# Patient Record
Sex: FEMALE | Race: WHITE
[De-identification: names, ages, dates, MRNs, and addresses within clinical notes are randomized per-mention and may not be internally consistent; named-entity substitution may affect disease eponyms.]

---

## 2017-01-27 ENCOUNTER — HOSPITAL ENCOUNTER (EMERGENCY)
Dept: HOSPITAL 62 - ER | Age: 27
Discharge: HOME | End: 2017-01-27
Payer: SELF-PAY

## 2017-01-27 VITALS — SYSTOLIC BLOOD PRESSURE: 116 MMHG | DIASTOLIC BLOOD PRESSURE: 70 MMHG

## 2017-01-27 DIAGNOSIS — Z87.892: ICD-10-CM

## 2017-01-27 DIAGNOSIS — Z88.6: ICD-10-CM

## 2017-01-27 DIAGNOSIS — F17.210: ICD-10-CM

## 2017-01-27 DIAGNOSIS — Z88.8: ICD-10-CM

## 2017-01-27 DIAGNOSIS — N83.201: Primary | ICD-10-CM

## 2017-01-27 DIAGNOSIS — N30.00: ICD-10-CM

## 2017-01-27 DIAGNOSIS — N76.0: ICD-10-CM

## 2017-01-27 DIAGNOSIS — Z88.0: ICD-10-CM

## 2017-01-27 DIAGNOSIS — R10.31: ICD-10-CM

## 2017-01-27 LAB
APPEARANCE UR: (no result)
BILIRUB UR QL STRIP: NEGATIVE
CHLAM PCR: NOT DETECTED
GLUCOSE UR STRIP-MCNC: NEGATIVE MG/DL
KETONES UR STRIP-MCNC: NEGATIVE MG/DL
NITRITE UR QL STRIP: POSITIVE
PH UR STRIP: 7 [PH] (ref 5–9)
PROT UR STRIP-MCNC: NEGATIVE MG/DL
SP GR UR STRIP: 1.02
UROBILINOGEN UR-MCNC: NEGATIVE MG/DL (ref ?–2)

## 2017-01-27 PROCEDURE — S0119 ONDANSETRON 4 MG: HCPCS

## 2017-01-27 PROCEDURE — 87591 N.GONORRHOEAE DNA AMP PROB: CPT

## 2017-01-27 PROCEDURE — 81001 URINALYSIS AUTO W/SCOPE: CPT

## 2017-01-27 PROCEDURE — 87491 CHLMYD TRACH DNA AMP PROBE: CPT

## 2017-01-27 PROCEDURE — 81025 URINE PREGNANCY TEST: CPT

## 2017-01-27 PROCEDURE — 99283 EMERGENCY DEPT VISIT LOW MDM: CPT

## 2017-01-27 NOTE — ER DOCUMENT REPORT
ED GI/





- General


Chief Complaint: Vaginal Itching


Stated Complaint: PAINFUL URINATION


Mode of Arrival: Ambulatory


Notes: 


Patient is complaining of pain in the right lower quadrant of her abdomen which 

she attributes to an ovary with multiple cysts that are known to be on that 

right pelvis and are scheduled to be surgically removed on February 10.  

Patient says that she went for a preliminary pelvic examination 2 days ago in 

which they examined her and also did a Pap smear and used a transvaginal 

ultrasound to examine her, in preparation for her surgery.  Yesterday, she 

noted the right lower quadrant pain and vaginal discharge with itching and 

swelling and odor.  She had some vomiting yesterday but not today, just 

nauseated and unable to eat.  She's had burning with urination and some 

frequent urination.  Not aware of any fever today, but states she may have had 

one yesterday.


LMP 1/14 on the birth control implant.


Patient has had 5 prior pelvic surgeries for ovarian cysts.  The plan is to 

remove the entire right ovary and the cyst in her surgery in 2 weeks.


TRAVEL OUTSIDE OF THE [place] IN LAST 30 DAYS: No





- Related Data


Allergies/Adverse Reactions: 


 





Penicillins Allergy (Severe, Verified 01/27/17 16:14)


 Anaphylaxis


acetaminophen [From Tylenol] Allergy (Intermediate, Verified 01/27/17 16:14)


 rash


ketorolac tromethamine [From Toradol] Allergy (Intermediate, Verified 01/27/17 

16:14)


 hyperventilate











Past Medical History





- General


Information source: Patient





- Social History


Smoking Status: Current Every Day Smoker


Cigarette use (# per day): Yes


Chew tobacco use (# tins/day): No


Frequency of alcohol use: None


Drug Abuse: None


Family History: Reviewed & Not Pertinent


Patient has suicidal ideation: No


Patient has homicidal ideation: No


Pulmonary Medical History: Reports: Hx Pneumonia


Renal/ Medical History: Reports: Hx Ovarian Cysts


Musculoskeltal Medical History: Denies Hx Arthritis


Psychiatric Medical History: Reports: Hx Anxiety, Hx Attention Deficit 

Hyperactivity Disorder, Hx Depression


Past Surgical History: Reports: Hx Gynecologic Surgery - ovarian cyst bilat





- Immunizations


Hx Diphtheria, Pertussis, Tetanus Vaccination: Yes


Hx Pneumococcal Vaccination: 11/01/15





Review of Systems





- Review of Systems


Notes: 


REVIEW OF SYSTEMS:


CONSTITUTIONAL :  Denies fever now, but think she had fever last night..  


EENT:   Denies eye, ear, nose or mouth or throat pain or other symptoms.


CARDIOVASCULAR:  Denies chest pain.


RESPIRATORY:  Denies cough, chest congestion, or shortness of breath.


GASTROINTESTINAL: See history of present illness.


GENITOURINARY: See history of present illness.


MUSCULOSKELETAL:  Denies back or neck pain.  Denies joint pain or swelling.


SKIN:   Denies rash or skin lesions.


NEUROLOGICAL:  Denies LOC or altered mental status.  Denies headache.  Denies 

sensory loss or motor deficits.


PSYCHIATRIC: History of anxiety and depression





ALL OTHER SYSTEMS REVIEWED AND NEGATIVE.





Physical Exam





- Vital signs


Vitals: 


 











Temp Pulse Resp BP Pulse Ox


 


 98.0 F   89   18   116/70   99 


 


 01/27/17 16:10  01/27/17 16:10  01/27/17 16:10  01/27/17 16:10  01/27/17 16:10











Interpretation: Normal





- Notes


Notes: 


PHYSICAL EXAMINATION:





GENERAL: Well-appearing, in no acute distress.  Vital signs are all normal.





HEAD: Atraumatic, normocephalic.





LUNGS: Breath sounds clear and equal bilaterally.





HEART: Regular rate and rhythm without murmurs.





ABDOMEN: Soft, but tender in the right lower quadrant/pelvic region.  The 

maximal point of tenderness is below the usual expected location of the 

appendix at McBurney's point.  No guarding or rebound.  No masses.  Patient 

says that the location of her pain is exactly where her ovary and cysts are 

located.


Pelvic exam deferred at this time.





BACK:  No tenderness throughout entire back.





EXTREMITIES: Normal range of motion without pain.





SKIN: Warm, dry, no rashes.





Course





- Re-evaluation


Re-evalutation: 





01/27/17 19:07


Urinalysis suggests a UTI with positive nitrites, leukocyte esterase, WBCs and 

bacteria seen on microscopic.


Chlamydia test negative.  Gonorrhea negative.





- Vital Signs


Vital signs: 


 











Temp Pulse Resp BP Pulse Ox


 


 98.0 F   89   18   116/70   99 


 


 01/27/17 16:10  01/27/17 16:10  01/27/17 16:10  01/27/17 16:10  01/27/17 16:10














- Laboratory


Laboratory results interpreted by me: 


 











  01/27/17





  16:37


 


Urine Nitrite  POSITIVE H


 


Ur Leukocyte Esterase  SMALL H














Discharge





- Discharge


Clinical Impression: 


UTI (urinary tract infection)


Qualifiers:


 Urinary tract infection type: acute cystitis Hematuria presence: without 

hematuria Qualified Code(s): N30.00 - Acute cystitis without hematuria





Vaginitis


Qualifiers:


 Chronicity: acute Qualified Code(s): N76.0 - Acute vaginitis





Ovarian cyst


Qualifiers:


 Laterality: right Qualified Code(s): N83.201 - Unspecified ovarian cyst, right 

side





Condition: Stable


Disposition: HOME, SELF-CARE


Additional Instructions: 


URINARY TRACT INFECTION:


     Your evaluation indicates that you have a urinary tract infection. This is 

due to germs growing in the bladder.  This is a common problem.


     This infection usually responds quickly to antibiotics.  Your antibiotic 

should be taken exactly as prescribed.  Drink plenty of fluids -- three to four 

quarts a day.


     Occasionally, a bladder anesthetic will be prescribed to help stop the 

feeling of urgency until the antibiotic has a chance to clear the infection.  

This may cause your urine to be dark orange.


     Certain urine infections require a culture.  If the doctor obtained a 

culture, the results will be back in two days.  You should call to see if a 

change in treatment is needed.


     A repeat urinalysis after you finish treatment is often recommended.  The 

physician will let you know if further testing is required.


     Call the doctor if you develop fever, chills, flank pain, inability to 

urinate, or blood in the urine.





ANTIBIOTIC THERAPY:


     You have been given an antibiotic prescription.  It's important that you 

take all the medication, unless instructed otherwise by your physician.  

Failure to complete the entire course can result in relapse of your condition.


     Common side effects of antibiotics include nausea, intestinal cramping, or 

diarrhea.  Women may develop vaginal yeast infections, and babies can get yeast 

(thrush) in the mouth following the use of antibiotics.  Contact your physician 

if you develop significant side effects from this medication.


     Allergy to this antibiotic can result in hives, wheezing, faintness, or 

itching.  If symptoms of allergy occur, stop the medication and call the doctor.





TRIMETHOPRIM-SULFA:


     You have been given a prescription for trimethoprim-sulfa (TMS, Septra, 

Bactrim).  This is a combination antibiotic of the sulfa class, often used for 

urinary tract infections, middle ear infections, bronchitis, shigella 

intestinal infection, and Pneumocystis pneumonia.


     TMS is usually well-tolerated.  Occasional side effects include nausea and 

decreased appetite.  Septra is not recommended for infants less than two months 

of age.  Do not take this medication if you have experienced severe side 

effects or allergy to sulfa medicine.


     You should stop this medicine at once and contact your physician if you 

develop any rash, joint pain, shortness of breath, bruising, or jaundice (

yellow color in the skin), or if you develop any other new or unusual symptoms.





VAGINITIS:


     Your exam shows that you have vaginitis, a vaginal infection.  The 

infection can be caused by a many different organisms, including trichomonas or 

Gardnerella.  The usual symptoms are vaginal irritation and discharge.


     The treatment is usually antibiotics such as Flagyl.  Laboratory tests can 

determine which germ is responsible.


     Use the medication as prescribed.  Because this infection can be 

transmitted sexually, your sexual partner may need to be checked and treated 

also.  If your physician has not discussed this with you, please check before 

resuming sexual relations.  If a culture shows gonorrhea or chlamydia, the 

infection must be reported to the health department.


     Call the doctor if you develop pelvic pain, fever, or problems with 

urination, or if you don't improve as expected.





VAGINAL YEAST INFECTION:


     You have evidence of a yeast infection -- called "candida."  A vaginal 

yeast infection often causes itching and discharge.  While not dangerous, it 

can be very unpleasant.  A yeast infection often follows the use of powerful 

antibiotics.  It is more likely to occur in diabetics.


     The treatment now is usually a single pill of Diflucan, but also an 

antifungal cream or suppository may be used for a few days.  You do not need to 

avoid sexual intercourse.


     Recurrences are common.  You can make a recurrence less likely by wearing 

cotton underwear and avoiding tight clothing.  For mild recurrences, you can 

try over-the-counter creams or suppositories that are made specifically for 

yeast.


     If the symptoms do not resolve, you should follow up for re-examination.  

Sometimes treatment of the sexual partner is necessary if infections are 

recurrent.





VAGINAL TRICHOMONAS INFECTION:


     Trichomoniasis is infection of the vagina or male genital tract with 

Trichomonas vaginalis. It can be asymptomatic or cause urethritis, vaginitis, 

or occasionally cystitis, epididymitis, or prostatitis. Diagnosis is by 

microscopic examination of vaginal or prostatic secretions or by urethral 

culture. Patients and sex partners are treated with metronidazole.


     T. vaginalis is a flagellated, sexually transmitted protozoan that more 

often infects women (about 20% of women of reproductive age) than men. 

Infection may be asymptomatic in either sex, but asymptomatic is the rule for 

men. In men, protozoa may persist for long periods in the  tract without 

causing symptoms; thus, protozoa may be transmitted unwittingly to sex 

partners. Trichomoniasis may account for up to 5% of nongonococcal, 

nonchlamydial urethritis in men in some areas. Co-infection with gonorrhea and 

other sexually transmitted diseases (STDs) is common.


     In women, symptoms range from none to copious, yellow-green, frothy 

vaginal discharge with soreness of the vulva and perineum, dyspareunia, and 

dysuria. Asymptomatic infection may become symptomatic at any time as the vulva 

and perineum become inflamed and edema develops in the labia. The vaginal walls 

and surface of the cervix may have punctate, red "strawberry" spots. Urethritis 

and possibly cystitis may also occur.


     Men are usually asymptomatic; however, sometimes urethritis results in a 

discharge that may be transient, frothy, or purulent or that causes dysuria and 

frequency, usually early in the morning. Often, urethritis is mild and causes 

only minimal urethral irritation and occasional moisture at the urethral meatus

, under the foreskin, or both. Epididymitis and prostatitis are rare 

complications.


     Trichomoniasis is suspected in women with vaginitis, in men with urethritis

, and in their sex partners. Suspicion is high if symptoms persist after 

patients have been evaluated and treated for other infections such as gonorrhea 

and chlamydial, mycoplasmal, and ureaplasmal infections.


     In women, diagnosis is based on clinical criteria and in-office testing. 

The saline wet mount is examined microscopically as soon as possible to detect 

trichomonads.In men, microscopy of urine is insensitive, although occasionally 

organisms are visible in a first-voided morning specimen or a centrifuged 

specimen. Cultures of urine and urethral swabs are more sensitive.


     As with diagnosis of any STD, patients with trichomoniasis should be 

tested to exclude other common STDs such as gonorrhea and chlamydial infection.


     Metronidazole or tinidazole 2 g po in a single dose cures up to 95% of 

women if sex partners are treated simultaneously. Effectiveness of single-dose 

regimens in men is not as clear, so treatment is typically with metronidazole 

or tinidazole 500 mg bid for 5 to 7 days.


     Sex partners should be screened and treated for trichomoniasis and other 

STDs. If poor adherence to follow-up is likely, treatment can be initiated in 

sex partners of patients with documented trichomoniasis without confirming the 

diagnosis in the partner.





ANTIBIOTIC THERAPY:


     You have been given an antibiotic prescription.  It's important that you 

take all the medication, unless instructed otherwise by your physician.  

Failure to complete the entire course can result in relapse of your condition.


     Common side effects of antibiotics include nausea, intestinal cramping, or 

diarrhea.  Women may develop vaginal yeast infections, and babies can get yeast 

(thrush) in the mouth following the use of antibiotics.  Contact your physician 

if you develop significant side effects from this medication.


     Allergy to this antibiotic can result in hives, wheezing, faintness, or 

itching.  If symptoms of allergy occur, stop the medication and call the doctor.





AZITHROMYCIN:


     Azithromycin (Zithromax) is a broad spectrum antibiotic in the same class 

as erythromycin.  It can treat a variety of bacterial infections, but is most 

frequently used for respiratory infections.


     Azithromycin is extremely long-lasting.  It accumulates in body tissues 

and continues to kill bacteria for many days.


     In order to improve absorption, Azithromycin should be taken at least one 

hour before or two hours after a meal.  It does not have the same strong 

tendency to upset the stomach as erythromycin and is usually very well 

tolerated.


     Patients who have had a rash or other true allergic reactions to 

erythromycin should not take this medication.  Call if you develop 

gastrointestinal distress, severe diarrhea, rash, hives, itching, or shortness 

of breath.





METRONIDAZOLE:


     Metronidazole (Flagyl) has been prescribed.  This medication is used to 

kill a type of bacteria called anaerobes, and protozoan parasites such as 

trichomonas and Giardia.


     Flagyl often causes a metallic taste in the mouth and mild nausea.


     Do not use alcohol in any form with Flagyl (including alcohol in 

medication elixirs).  Flagyl interacts with alcohol to cause flushing, 

palpitations, headache, stomach cramps, and vomiting.  Do not use Flagyl if you 

are taking Antabuse (disulfiram).


     Call the doctor at once if you develop rash, shortness of breath, itching, 

or lightheadedness.





FLUCONAZOLE:


     Fluconazole (Diflucan) is an antifungal drug.  It is useful for serious 

fungal infections, but is also excellent for oral or vaginal yeast infections.


     Diflucan interacts with some medicines.  This is a concern if you are 

taking anticoagulants (such as Coumadin), phenytoin (Dilantin), cyclosporin, or 

oral hypoglycemics (such as tolbutamide, Orinase, glipizide, Glucotrol, 

glyburide, DiaBeta, Glynase, and Micronase).  Be sure the doctor knows if you 

are taking one of these medicines.


     We don't know how Diflucan affects pregnancy.  If you are planning to 

become pregnant, discuss this with your doctor.


     Diflucan has few side effects.  Minor side effects may include nausea, 

headache, or diarrhea.  Call the doctor if you develop a skin rash, shortness 

of breath, or other new symptoms.





Ovarian Cyst


     Your examination shows the presence of an ovarian cyst.  This is a ball of 

fluid attached to the ovary.  Ovarian cysts in women of child-bearing age are 

usually innocent.  However, the cyst may cause pain when it grows or bursts.  

An innocent ovarian cyst will usually go away by itself.


     When the cyst becomes painful, you should rest.  Pain medication may be 

required.  Some women find a hot water bottle soothing.  The pain usually 

resolves within one or two days.


     After menopause, an ovarian cyst may mean a tumor, and requires more 

aggressive evaluation -- usually surgery is recommended to remove or biopsy the 

cyst.  A very large cyst requires evaluation at any age. Most cysts (even the 

innocent ones) require follow-up examination.


     Call the doctor or return at any time if the pain increases significantly, 

if you become faint, or if you experience vaginal bleeding.





Antinausea Medication


     You have been given a medication to suppress nausea and vomiting. This 

type of medication can be given as a shot, pill, or suppository. It will 

usually last for many hours.  Pills and shots usually last six to eight hours, 

suppositories last about 12 hours.  For the typical illness, only one or two 

doses of the medication may be necessary.


     Mild lightheadedness may occur.  This type of medicine can cause 

drowsiness.  Do not drive or operate dangerous machinery while under its 

influence.  Do not mix with alcohol.


     See your doctor at once if you have muscle spasms or tightness, or 

uncontrollable motions (particularly of the neck, mouth, or jaw). Persistent 

vomiting or severe lightheadedness should also be evaluated by the physician.





Oral Narcotic Medication


     You have been given a prescription for pain control.  This medication is a 

narcotic.  It's best taken with food, as nausea can result if taken on an empty 

stomach.


     Don't operate machinery or drive within six hours of taking this 

medication.  Do not combine this medicine with alcohol, or with any medication 

which can cause sedation (such as cold tablets or sleeping pills) unless you 

get permission from the physician.


     Narcotics tend to cause constipation.  If possible, drink plenty of fluids 

and eat a diet high in fiber and fruits.





Ibuprofen


     Ibuprofen is an excellent, safe drug for pain control.  In addition, it 

has potent antiinflammatory effects which are beneficial, especially in the 

treatment of injuries, arthritis, or tendonitis. It's best to take ibuprofen 

with food.  Persons with ulcer disease or allergy to aspirin should notify 

their physician of this before taking ibuprofen.


     Take the medication exactly as prescribed.  Don't take additional doses 

unless instructed to do so by your doctor.  If you develop wheezing, shortness 

of breath, hives, faintness, stomach pain, vomiting, or dark black stools, 

return for re-evaluation at once.





FOLLOW-UP CARE:


If you have been referred to a physician for follow-up care, call the physician

s office for an appointment as you were instructed or within the next two days.

  If you experience worsening or a significant change in your symptoms, notify 

the physician immediately or return to the Emergency Department at any time for 

re-evaluation.





[facility]





Return for reevaluation if you develop fever, worsening abdominal pains, or 

other new or worrisome symptoms.


Prescriptions: 


Oxycodone HCl 5 - 10 mg PO Q6HP PRN #12 tablet


 PRN Reason: 


Metronidazole 500 mg PO BID #14 tablet


Promethazine HCl [Phenergan 25 mg Tablet] 1 - 2 tab PO Q6H PRN #15 tablet


 PRN Reason: 


Sulfamethoxazole/Trimethoprim [Bactrim Ds Tablet] 1 each PO BID #10 tablet

## 2018-11-14 ENCOUNTER — HOSPITAL ENCOUNTER (EMERGENCY)
Dept: HOSPITAL 62 - ER | Age: 28
LOS: 1 days | Discharge: HOME | End: 2018-11-15
Payer: OTHER GOVERNMENT

## 2018-11-14 VITALS — SYSTOLIC BLOOD PRESSURE: 146 MMHG | DIASTOLIC BLOOD PRESSURE: 85 MMHG

## 2018-11-14 DIAGNOSIS — Z87.892: ICD-10-CM

## 2018-11-14 DIAGNOSIS — R06.02: ICD-10-CM

## 2018-11-14 DIAGNOSIS — R61: ICD-10-CM

## 2018-11-14 DIAGNOSIS — Z87.01: ICD-10-CM

## 2018-11-14 DIAGNOSIS — R11.10: ICD-10-CM

## 2018-11-14 DIAGNOSIS — Z88.8: ICD-10-CM

## 2018-11-14 DIAGNOSIS — R10.9: ICD-10-CM

## 2018-11-14 DIAGNOSIS — Z88.6: ICD-10-CM

## 2018-11-14 DIAGNOSIS — F17.210: ICD-10-CM

## 2018-11-14 DIAGNOSIS — R63.0: ICD-10-CM

## 2018-11-14 DIAGNOSIS — R05: ICD-10-CM

## 2018-11-14 DIAGNOSIS — Z88.0: ICD-10-CM

## 2018-11-14 DIAGNOSIS — J40: Primary | ICD-10-CM

## 2018-11-14 PROCEDURE — 99283 EMERGENCY DEPT VISIT LOW MDM: CPT

## 2018-11-14 PROCEDURE — 71046 X-RAY EXAM CHEST 2 VIEWS: CPT

## 2018-11-15 NOTE — ER DOCUMENT REPORT
ED General





- General


Mode of Arrival: Ambulatory


Information source: Patient


TRAVEL OUTSIDE OF THE [place] IN LAST 30 DAYS: No





- General


Chief Complaint: Productive Cough


Stated Complaint: SHORTNESS OF BREATH


Time Seen by Provider: 11/15/18 00:05


Notes: 


Patient is a 28 year old female with a history of ovarian cysts presents to the 

emergency department complaining of multiple symptoms including a productive 

cough, shortness of breath, diaphoresis, bilateral flank pain, and decreased 

appetite.  Patient states yesterday she felt her hands and feet were cold and 

diaphoretic while the rest of her body felt hot. She states she also developed 

a productive cough with yellow sputum  further stating her symptoms feel 

similar to when she had pneumonia. Patient also complains of posttussive emesis 

as well as vomiting when attempting to eat. 





Patient's LMP was on 11/3/2018. She denies taking any medications but has since 

filled a prescription for 200mg of Lyrica TID on 10/24/2018. 


 (KAY DAMICO)





- Related Data


Allergies/Adverse Reactions: 


 





Penicillins Allergy (Severe, Verified 01/27/17 16:14)


 Anaphylaxis


acetaminophen [From Tylenol] Allergy (Intermediate, Verified 01/27/17 16:14)


 rash


ketorolac tromethamine [From Toradol] Allergy (Intermediate, Verified 01/27/17 

16:14)


 hyperventilate


ondansetron [From Zofran] Allergy (Verified 11/15/18 01:09)


 











Past Medical History





- General


Information source: Patient





- Social History


Smoking Status: Current Every Day Smoker


Cigarette use (# per day): Yes - 1/2 PPD


Smoking Education Provided: No


Family History: Reviewed & Not Pertinent


Pulmonary Medical History: Reports: Hx Pneumonia


Renal/ Medical History: Reports: Hx Ovarian Cysts


Psychiatric Medical History: Reports: Hx Anxiety, Hx Attention Deficit 

Hyperactivity Disorder, Hx Depression


Past Surgical History: Reports: Hx Gynecologic Surgery - ovarian cyst bilat





- Immunizations


Hx Diphtheria, Pertussis, Tetanus Vaccination: Yes


Hx Pneumococcal Vaccination: 11/01/15





Review of Systems





- Review of Systems


Constitutional: See HPI, Diaphoresis


EENT: No symptoms reported


Cardiovascular: No symptoms reported


Respiratory: See HPI, Cough, Short of breath


Gastrointestinal: See HPI, Vomiting


Genitourinary: No symptoms reported


Female Genitourinary: No symptoms reported


Musculoskeletal: No symptoms reported


Skin: No symptoms reported


Hematologic/Lymphatic: No symptoms reported


Neurological/Psychological: No symptoms reported


-: Yes All other systems reviewed and negative





Physical Exam





- Vital signs


Vitals: 


 











Temp Pulse Resp BP Pulse Ox


 


 98.4 F   98   18   146/85 H  99 


 


 11/14/18 22:52  11/14/18 22:52  11/14/18 22:52  11/14/18 22:52  11/14/18 22:52














- Notes


Notes: 


GENERAL: Alert, interacts well. No acute distress.


HEAD: Normocephalic, atraumatic.


EYES: Pupils equal, round, and reactive to light. Extraocular movements intact.


ENT: Oral mucosa moist, tongue midline. Nares patent, no nasal septal hematoma, 

TM's intacts.


NECK: Full range of motion. Supple. Trachea midline.


LUNGS: Coarse breath sounds.  Rhonchi. No respiratory distress.


HEART: Regular rate and rhythm. No murmurs, gallops, or rubs.


ABDOMEN: Soft, non-tender. Non-distended. Bowel sounds present in all 4 

quadrants.


EXTREMITIES: Moves all 4 extremities spontaneously. No edema, radial and 

dorsalis pedis pulses 2/4 bilaterally. No cyanosis.


NEUROLOGICAL: Alert and oriented x3. Normal speech. 


PSYCH: Normal affect, normal mood.


SKIN: Warm, dry, normal turgor. No rashes or lesions noted.   


BACK: Paravertebral lower thoracic and upper lumbar tenderness to percussion.


 (KAY DAMICO)





Course





- Re-evaluation


Re-evalutation: 





11/15/18 01:11


The nurse just informed me that the patient told her she is allergic to Zofran.

  This was not listed on her chart among her drug allergies.  She told the 

nurse that she got hives on her chest.  The Zofran was not given.  The Zofran 

dispense was canceled.  Patient was requesting Phenergan and a prescription for 

Phenergan. (DAKSHA GRAHAM)





- Vital Signs


Vital signs: 


 











Temp Pulse Resp BP Pulse Ox


 


 98.4 F   98   18   146/85 H  99 


 


 11/14/18 22:52  11/14/18 22:52  11/14/18 22:52  11/14/18 22:52  11/14/18 22:52














Discharge





- Discharge


Clinical Impression: 


 Bronchitis





Condition: Stable


Disposition: HOME, SELF-CARE


Additional Instructions: 


Bronchitis





     You have acute bronchitis.  This disease is an infection or inflammation 

of the air passageways in your lungs.  Symptoms usually include cough, low 

grade fever, shortness of breath, and wheezing.  The cough usually persists for 

a couple of weeks.


     Most cases of bronchitis get better without antibiotics.  We prescribe 

antibiotics when we believe bacteria are damaging your airways, or if there's 

high risk the bronchitis will worsen into pneumonia.


     Increase your fluid intake. A cool mist humidifier may make your lungs 

more comfortable.  An expectorant (cough medicine that loosens phlegm) can 

help.  If you smoke, STOP!!!  Recovery from bronchitis can be somewhat slow, 

but you should see improvement within a day or two.


     Repeated episodes of bronchitis may result in lung damage -- for example, 

chronic bronchitis, recurrent pneumonias, or emphysema.


     Call the doctor if you develop increasing fever, shortness of breath, 

chest pain, bloody sputum, or otherwise worsen.  If you have not improved at 

all after several days, contact the physician.





********************************************************************************

*************





Take the doxycycline as prescribed--always take with a large glass of water.


Take the Phenergan for nausea if needed.


Drink plenty of fluids and get plenty of rest.


Stop smoking.


Try Robitussin-DM to help suppress your cough.


Follow-up with a local primary care provider if not improving.





RETURN TO THE EMERGENCY ROOM IF ANY NEW OR WORSENING SYMPTOMS.








Prescriptions: 


Doxycycline Hyclate 100 mg PO BID #14 capsule


Promethazine HCl [Phenergan 25 mg Tablet] 25 mg PO Q6 PRN #12 tablet


 PRN Reason: 


Clementina Attestation: 





11/15/18 00:35


I personally performed the services described in the documentation, reviewed 

and edited the documentation which was dictated to the scribe in my presence, 

and it accurately records my words and actions. (DAKSHA GRAHAM)





Clementina Documentation





- Scribe


Written by Clementina:: Clementina Dowling, 11/15/2018 00:42


acting as scribe for :: John

## 2018-11-15 NOTE — RADIOLOGY REPORT (SQ)
EXAM DESCRIPTION: 



XR CHEST 2 VIEWS



COMPLETED DATE/TME:  11/15/2018 00:15



CLINICAL HISTORY: 



28 years, Female, Productive cough with fever



COMPARISON:

Prior chest x-ray 6/1/2015



NUMBER OF VIEWS:

2



TECHNIQUE:

Frontal and lateral views of the chest



LIMITATIONS:

None.



FINDINGS:



Heart size is normal. Lungs are clear. No pneumothorax



IMPRESSION:



Negative chest

 



 2011 [facility], [place] [facility] [employer]- All Rights Reserved

## 2019-07-15 ENCOUNTER — HOSPITAL ENCOUNTER (EMERGENCY)
Dept: HOSPITAL 62 - ER | Age: 29
Discharge: HOME | End: 2019-07-15
Payer: OTHER GOVERNMENT

## 2019-07-15 VITALS — SYSTOLIC BLOOD PRESSURE: 107 MMHG | DIASTOLIC BLOOD PRESSURE: 67 MMHG

## 2019-07-15 DIAGNOSIS — Z87.892: ICD-10-CM

## 2019-07-15 DIAGNOSIS — N39.0: ICD-10-CM

## 2019-07-15 DIAGNOSIS — Z71.6: ICD-10-CM

## 2019-07-15 DIAGNOSIS — Z88.8: ICD-10-CM

## 2019-07-15 DIAGNOSIS — Z88.0: ICD-10-CM

## 2019-07-15 DIAGNOSIS — R31.9: ICD-10-CM

## 2019-07-15 DIAGNOSIS — F17.210: ICD-10-CM

## 2019-07-15 DIAGNOSIS — R55: Primary | ICD-10-CM

## 2019-07-15 DIAGNOSIS — R50.9: ICD-10-CM

## 2019-07-15 LAB
ADD MANUAL DIFF: NO
ALBUMIN SERPL-MCNC: 4.5 G/DL (ref 3.5–5)
ALP SERPL-CCNC: 40 U/L (ref 38–126)
ALT SERPL-CCNC: 26 U/L (ref 9–52)
ANION GAP SERPL CALC-SCNC: 7 MMOL/L (ref 5–19)
APPEARANCE UR: (no result)
APTT PPP: YELLOW S
AST SERPL-CCNC: 31 U/L (ref 14–36)
BASOPHILS # BLD AUTO: 0 10^3/UL (ref 0–0.2)
BASOPHILS NFR BLD AUTO: 0.2 % (ref 0–2)
BILIRUB DIRECT SERPL-MCNC: 0.2 MG/DL (ref 0–0.4)
BILIRUB SERPL-MCNC: 0.3 MG/DL (ref 0.2–1.3)
BILIRUB UR QL STRIP: NEGATIVE
BUN SERPL-MCNC: 11 MG/DL (ref 7–20)
CALCIUM: 9.7 MG/DL (ref 8.4–10.2)
CHLORIDE SERPL-SCNC: 107 MMOL/L (ref 98–107)
CO2 SERPL-SCNC: 25 MMOL/L (ref 22–30)
EOSINOPHIL # BLD AUTO: 0.1 10^3/UL (ref 0–0.6)
EOSINOPHIL NFR BLD AUTO: 1.5 % (ref 0–6)
ERYTHROCYTE [DISTWIDTH] IN BLOOD BY AUTOMATED COUNT: 12.5 % (ref 11.5–14)
GLUCOSE SERPL-MCNC: 100 MG/DL (ref 75–110)
GLUCOSE UR STRIP-MCNC: NEGATIVE MG/DL
HCT VFR BLD CALC: 44.4 % (ref 36–47)
HGB BLD-MCNC: 14.9 G/DL (ref 12–15.5)
KETONES UR STRIP-MCNC: NEGATIVE MG/DL
LYMPHOCYTES # BLD AUTO: 1.8 10^3/UL (ref 0.5–4.7)
LYMPHOCYTES NFR BLD AUTO: 35.9 % (ref 13–45)
MCH RBC QN AUTO: 31.6 PG (ref 27–33.4)
MCHC RBC AUTO-ENTMCNC: 33.5 G/DL (ref 32–36)
MCV RBC AUTO: 94 FL (ref 80–97)
MONOCYTES # BLD AUTO: 0.5 10^3/UL (ref 0.1–1.4)
MONOCYTES NFR BLD AUTO: 11 % (ref 3–13)
NEUTROPHILS # BLD AUTO: 2.5 10^3/UL (ref 1.7–8.2)
NEUTS SEG NFR BLD AUTO: 51.4 % (ref 42–78)
NITRITE UR QL STRIP: NEGATIVE
PH UR STRIP: 5 [PH] (ref 5–9)
PLATELET # BLD: 226 10^3/UL (ref 150–450)
POTASSIUM SERPL-SCNC: 4 MMOL/L (ref 3.6–5)
PROT SERPL-MCNC: 7.7 G/DL (ref 6.3–8.2)
PROT UR STRIP-MCNC: NEGATIVE MG/DL
RBC # BLD AUTO: 4.71 10^6/UL (ref 3.72–5.28)
SODIUM SERPL-SCNC: 138.5 MMOL/L (ref 137–145)
SP GR UR STRIP: 1.02
TOTAL CELLS COUNTED % (AUTO): 100 %
UROBILINOGEN UR-MCNC: NEGATIVE MG/DL (ref ?–2)
WBC # BLD AUTO: 4.9 10^3/UL (ref 4–10.5)

## 2019-07-15 PROCEDURE — 93005 ELECTROCARDIOGRAM TRACING: CPT

## 2019-07-15 PROCEDURE — 85025 COMPLETE CBC W/AUTO DIFF WBC: CPT

## 2019-07-15 PROCEDURE — 80053 COMPREHEN METABOLIC PANEL: CPT

## 2019-07-15 PROCEDURE — 36415 COLL VENOUS BLD VENIPUNCTURE: CPT

## 2019-07-15 PROCEDURE — 93010 ELECTROCARDIOGRAM REPORT: CPT

## 2019-07-15 PROCEDURE — 96360 HYDRATION IV INFUSION INIT: CPT

## 2019-07-15 PROCEDURE — 99284 EMERGENCY DEPT VISIT MOD MDM: CPT

## 2019-07-15 PROCEDURE — 96361 HYDRATE IV INFUSION ADD-ON: CPT

## 2019-07-15 PROCEDURE — 81025 URINE PREGNANCY TEST: CPT

## 2019-07-15 PROCEDURE — 87086 URINE CULTURE/COLONY COUNT: CPT

## 2019-07-15 PROCEDURE — 76770 US EXAM ABDO BACK WALL COMP: CPT

## 2019-07-15 PROCEDURE — 81001 URINALYSIS AUTO W/SCOPE: CPT

## 2019-07-15 PROCEDURE — 99406 BEHAV CHNG SMOKING 3-10 MIN: CPT

## 2019-07-15 PROCEDURE — 87088 URINE BACTERIA CULTURE: CPT

## 2019-07-15 PROCEDURE — 87186 SC STD MICRODIL/AGAR DIL: CPT

## 2019-07-15 NOTE — RADIOLOGY REPORT (SQ)
EXAM DESCRIPTION:  U/S RETROPERITON (RENAL/AORTA)



COMPLETED DATE/TIME:  7/15/2019 6:24 pm



REASON FOR STUDY:  BL flank pain



COMPARISON:  None.



TECHNIQUE:  Dynamic and static grayscale images acquired of the kidneys and bladder and recorded on P
ACS. Additional selected color Doppler and spectral images recorded.



LIMITATIONS:  None.



FINDINGS:  RIGHT KIDNEY: Normal size. Normal echogenicity. No solid or suspicious masses. No hydronep
hrosis. No calcifications.

LEFT KIDNEY:  Normal size. Normal echogenicity. No solid or suspicious masses. No hydronephrosis. No 
calcifications.

BLADDER: No masses.

OTHER FINDINGS: No other significant finding.



IMPRESSION:  NORMAL RENAL AND BLADDER ULTRASOUND.



TECHNICAL DOCUMENTATION:  JOB ID:  [id number]

[id number]

 2011 [employer]- All Rights Reserved



Reading location - IP/workstation name: [email or web address]

## 2019-07-15 NOTE — ER DOCUMENT REPORT
ED GI/





- General


Chief Complaint: Urinary Problem


Stated Complaint: SHORTNESS OF BREATH


Time Seen by Provider: 07/15/19 16:34


Primary Care Provider: 


[facility] [Primary [facility] Provider] - Follow up as needed


Mode of Arrival: Ambulatory


Information source: Patient


Notes: 





28-year-old female presented to ED for complaint of passing out at work today.  

She states she was recently diagnosed with a UTI and placed on Bactrim.  She 

states she has been taking the Bactrim but her temperature this morning was 

102.5.  She states she has had fevers and chills.  She states she has flank pain

and today at work she got very dizzy.  She states the coworker said she did not 

actually pass out because her eyes never closed and she was able to see them and

talk to them as it is she fell.  She states she has bilateral flank pain and 

continues to have pain and burning with urination.


TRAVEL OUTSIDE OF THE [place] IN LAST 30 DAYS: No





- HPI


Patient complains to provider of: Dysuria, Flank pain - Lateral, Hematuria


Onset: Last week


Timing/Duration: Persistent


Quality of pain: Burning, Sharp


Severity at maximum: Moderate


Severity in ED: Moderate


Pain Level: 2


Location: Left flank, Right flank, Suprapubic, Other - Burning with urination


Vaginal bleeding (Compared to normal period): None


Associated symptoms: Dysuria, Hematuria, Radiates to back, Syncope, Urinary 

frequency, Urinary urgency


Exacerbated by: Movement, Walking


Relieved by: Denies


Similar symptoms previously: Yes


Recently seen / treated by doctor: Yes





- Related Data


Allergies/Adverse Reactions: 


                                        





Penicillins Allergy (Severe, Verified 07/15/19 12:55)


   Anaphylaxis


acetaminophen [From Tylenol] Allergy (Intermediate, Verified 07/15/19 12:55)


   rash


ketorolac tromethamine [From Toradol] Allergy (Intermediate, Verified 07/15/19 

12:55)


   hyperventilate


ondansetron [From Zofran] Allergy (Verified 07/15/19 12:55)


   











Past Medical History





- General


Information source: Patient





- Social History


Smoking Status: Current Every Day Smoker


Cigarette use (# per day): Yes - 3 cigarettes a day


Chew tobacco use (# tins/day): No


Smoking Education Provided: Yes - 4 minutes


Frequency of alcohol use: Social


Drug Abuse: None


Occupation: [employer]


Lives with: Spouse/Significant other


Family History: Reviewed & Not Pertinent


Patient has suicidal ideation: No


Patient has homicidal ideation: No





- Past Medical History


Cardiac Medical History: Reports: None


Pulmonary Medical History: Reports: Hx Pneumonia


EENT Medical History: Reports: None


Neurological Medical History: Reports: None


Endocrine Medical History: Reports: None


Renal/ Medical History: Reports: Hx Ovarian Cysts


Malignancy Medical History: Reports: None


GI Medical History: Reports: None


Musculoskeletal Medical History: Reports None


Psychiatric Medical History: Reports: Hx Anxiety, Hx Attention Deficit Hyperact

ivity Disorder, Hx Depression


Traumatic Medical History: Reports: None


Infectious Medical History: Reports: None


Past Surgical History: Reports: Hx Gynecologic Surgery - ovarian cyst bilat





- Immunizations


Hx Diphtheria, Pertussis, Tetanus Vaccination: Yes


Hx Pneumococcal Vaccination: 11/01/15





Review of Systems





- Review of Systems


Constitutional: No symptoms reported


EENT: No symptoms reported


Cardiovascular: Syncope


Respiratory: No symptoms reported


Gastrointestinal: Nausea


Genitourinary: Burning, Dysuria, Flank pain, Urgency


Female Genitourinary: No symptoms reported


Musculoskeletal: No symptoms reported


Skin: No symptoms reported


Hematologic/Lymphatic: No symptoms reported


Neurological/Psychological: No symptoms reported


-: Yes All other systems reviewed and negative





Physical Exam





- Vital signs


Vitals: 


                                        











Temp Pulse Resp BP Pulse Ox


 


 98.1 F   72   20   108/73   98 


 


 07/15/19 14:07  07/15/19 14:07  07/15/19 14:07  07/15/19 14:07  07/15/19 14:07











Interpretation: Normal





- General


General appearance: Appears well, Alert





- HEENT


Head: Normocephalic, Atraumatic


Eyes: Normal


Pupils: PERRL





- Respiratory


Respiratory status: No respiratory distress


Chest status: Nontender


Breath sounds: Normal


Chest palpation: Normal





- Cardiovascular


Rhythm: Regular


Heart sounds: Normal auscultation


Murmur: No





- Abdominal


Inspection: Normal


Distension: No distension


Bowel sounds: Normal


Tenderness: Nontender


Organomegaly: No organomegaly





- Back


Back: Normal, CVA tenderness - right tender better





- Extremities


General upper extremity: Normal inspection, Nontender, Normal color, Normal ROM,

Normal temperature


General lower extremity: Normal inspection, Nontender, Normal color, Normal ROM,

Normal temperature, Normal weight bearing.  No: Stacey's sign





- Neurological


Neuro grossly intact: Yes


Cognition: Normal


Orientation: AAOx4


[place] Coma Scale Eye Opening: Spontaneous


[place] Coma Scale Verbal: Oriented


[place] Coma Scale Motor: Obeys Commands


[place] Coma Scale Total: 15


Speech: Normal


Motor strength normal: LUE, RUE, LLE, RLE


Sensory: Normal





- Psychological


Associated symptoms: Normal affect, Normal mood





- Skin


Skin Temperature: Warm


Skin Moisture: Dry


Skin Color: Normal





Course





- Re-evaluation


Re-evalutation: 





07/15/19 19:47


Labs and ultrasound discussed with patient.  Written report of labs and 

ultrasound given to patient.  Patient was able to verbalize understanding of 

teaching instructions and patient will be discharged home.





- Vital Signs


Vital signs: 


                                        











Temp Pulse Resp BP Pulse Ox


 


 98.1 F   72   20   108/73   98 


 


 07/15/19 14:07  07/15/19 14:07  07/15/19 14:07  07/15/19 14:07  07/15/19 14:07














- Laboratory


Result Diagrams: 


                                 07/15/19 17:30





                                 07/15/19 17:30


Laboratory results interpreted by me: 


                                        











  07/15/19





  17:30


 


Ur Leukocyte Esterase  TRACE H


 


Urine Ascorbic Acid  40 H














- Diagnostic Test


Radiology reviewed: Image reviewed, Reports reviewed





Discharge





- Discharge


Clinical Impression: 


 Bilateral flank pain, Near syncope





Condition: Stable


Disposition: HOME, SELF-CARE


Additional Instructions: 


Syncopal Episode





     Syncope (fainting or near-fainting) can occur from many different health 

problems.  Or it can be a simple fainting spell requiring no treatment.  It is 

safe for you to go home, but further evaluation will likely be necessary.


     Your work-up may include tests for internal bleeding, heart disease, 

medication problems, or near-strokes.  Tests are not always required, however, 

depending on the nature of your problem.


     The warning signs of an impending faint include: dizziness, 

lightheadedness, nausea, hot flashes, tingling, and weakness.  If this happens, 

lay down and put your feet up, then wait until all of these symptoms have passed

before standing up again.


     If these episodes become recurrent, or if you develop chest pain, heart 

palpitations, mental confusion, blurred vision, or headache, then you should 

call the physician, or go to the emergency room.





Flank Pain





     We weren't able to prove an exact cause for your flank pain. Pain in the 

flank can be caused by a muscle strain or spasm. Sometimes a kidney stone causes

pain, but can't be found on our tests. Infection in the kidney should be evident

on a urine test. Early shingles can occasionally cause flank pain, without the 

rash that proves the diagnosis. On rare occasions, disease of the pancreas, 

aorta, spleen, or colon can create pain in the flank.


     At this time, there's no evidence of a dangerous condition, and it seems 

safe for you to be at home. If the pain goes away and does not come back, no 

further testing will be needed. If pain persists, or becomes more severe, we may

need to repeat some tests or order additional new testing.


     Blood in the urine, urgency to urinate frequently, and pain that radiates 

to the groin can indicate a kidney stone. Fever may mean that the pain is due to

infection, either of the kidney or the colon (diverticulitis). If your pain is 

early shingles, you should develop an eruption of blisters in the painful area 

within a few days. 


     Call the doctor or return if you have pain that is spreading or becoming 

more severe, pain that does not resolve with time, fever, or any other new 

symptoms.





VOMITING:





     Vomiting (or nausea without vomiting) can be caused by many other different

problems. It can mean that something's wrong with the stomach, such as ulcers or

inflammation or the intestinal tract, such as appendicitis.  But it can also be 

a symptom of a problem that has nothing to do with the stomach or intestines. 

Vomiting is common with severe headaches, earaches, tonsillitis, and kidney 

infections, etc. We see it with pneumonia or heart attacks. Drugs can cause 

nausea and vomiting. Many abdominal problems cause vomiting; for example, 

gallstones, kidney stones, pancreatitis, and intestinal obstruction (blocked 

bowels).


     In most cases, curing the vomiting depends on fixing the problem that 

caused it. For temporary relief, we may use an anti-nausea medicine. For home 

use, we can prescribe suppositories, chewable pills, pills that dissolve in the 

mouth, or liquid anti-nausea drugs. If the vomiting seems to be caused by a 

problem in the stomach, acid-suppressing drugs may be prescribed as well.


     It's important to avoid dehydration. Sip small amounts of clear liquids 

(soft drinks, tea, broth, etc) . Try to take fluids frequently even if you are 

vomiting to prevent dehydration.  Take increasing amounts of fluid and when 

liquids are being consumed successfully, advance to small amounts of bland food 

(toast, soups, mashed potatoes, etc.) until you are able to resume a regular 

diet.  Avoid aspirin, tobacco, and alcohol.


     If the vomiting worsens, if the problem that's making you vomit worsens, or

if there's evidence of bleeding in the stomach (such as black, tarry stool, or 

bloody or black vomit), you should return immediately. Also, return if abdominal

pain worsens or becomes localized to one area or you develop high fever.  Call 

your doctor if you aren't improved in 24 hours.








VIRAL SYNDROME:


     The physician has diagnosed a viral infection.  Viruses not only cause 

"colds," but can cause many different symptoms including generalized aching, 

fever, headache, cough, diarrhea, nausea, vomiting, and fatigue.


     The treatment, for the most part, is simply relief of symptoms. This means 

that antibiotics are usually not given.  Rest, fluids, pain medications and, 

occasionally, medication for the specific symptoms that are most bothersome will

be prescribed. Use good handwashing to avoid passing the virus to others. Shared

toys should be cleaned with disinfectant. Clean the toilets, sinks, and counter 

surfaces in bathrooms. Launder clothing in hot water.


     Contact the physician if you develop any new or unusual symptoms such as 

severe headache, stiff neck, high fever, chest pain, productive cough, or 

shortness of breath.  You should be rechecked if you don't see marked 

improvement within seven to 10 days.








INTRAVENOUS (I V) FLUIDS:


     As part of your care today, you received intravenous (IV) fluids.  IV 

fluids are administered to patients who are dehydrated or to those who have 

certain chemical (electrolyte) abnormalities that need correcting.








[place] MEDICATION:


     You have been given a medication to suppress nausea and vomiting. This type

of medication can be given as a shot, pill, or suppository. It will usually last

for many hours.  Pills and shots usually last six to eight hours.  For the 

typical illness, only one or two doses of the medication may be necessary.


     Mild lightheadedness may occur.  This type of medicine can cause 

drowsiness.  Do not drive or operate dangerous machinery while under its 

influence.  Do not mix with alcohol.


     See your doctor at once if you have muscle spasms or tightness, or 

uncontrollable motions (particularly of the neck, mouth, or jaw). Persistent 

vomiting or severe lightheadedness should also be evaluated by the physician.








FOLLOW-UP CARE:


If you have been referred to a physician for follow-up care, call the 

physicians office for an appointment as you were instructed or within the next 

two days.  If you experience worsening or a significant change in your symptoms,

notify the physician immediately or return to the Emergency Department at any 

time for re-evaluation.


Prescriptions: 


Promethazine HCl [Phenergan 25 mg Tablet] 25 mg PO Q6H PRN #15 tablet


 PRN Reason: 


Forms:  Return to Work, Smoking Cessation Education


Referrals: 


[facility] [[facility]] - Follow up as needed

## 2019-07-15 NOTE — ER DOCUMENT REPORT
ED Medical Screen (RME)





- General


Chief Complaint: Urinary Problem


Stated Complaint: SHORTNESS OF BREATH


Time Seen by Provider: 07/15/19 16:34


Notes: 





Patient is a 28-year-old female presents to the emergency department for passing

out today at work.  Patient states she was recently diagnosed with a urinary 

tract infection placed on Bactrim.  States this morning she had a fever T-max 

102.5.  States she also has generalized chills.  States she has lower back pain 

and today at work felt very lightheaded and dizzy.  States she remembers feeling

lightheaded dizzy, and then remembers looking up at all of her coworkers.  

Patient's denying any head, neck, upper back pain.  States she has bilateral 

flank pain and continues with generalized dysuria.  Patient denies any vaginal 

discharge.





GENERAL: Alert, interacts well. No acute distress.


LUNGS: Clear to auscultation bilaterally, no wheezes, rales, or rhonchi. No 

respiratory distress.


HEART: Regular rate and rhythm. No murmur


ABDOMEN: Soft, non-tender. Non-distended. Bowel sounds present in all 4 

quadrants.








I have greeted and performed a rapid initial assessment of this patient.  A 

comprehensive ED assessment and evaluation of the patient, analysis of test 

results and completion of the medical decision making process will be conducted 

by additional ED providers.





I have specifically instructed the patient or family members with the patient to

immediately return to any nursing staff should anything change in the patient's 

condition or with their chief complaint.





This medical record was dictated with voice recognizing software.  There may be 

grammatical, syntax errors that are unintended.


TRAVEL OUTSIDE OF THE [place] IN LAST 30 DAYS: No





- Related Data


Allergies/Adverse Reactions: 


                                        





Penicillins Allergy (Severe, Verified 07/15/19 12:55)


   Anaphylaxis


acetaminophen [From Tylenol] Allergy (Intermediate, Verified 07/15/19 12:55)


   rash


ketorolac tromethamine [From Toradol] Allergy (Intermediate, Verified 07/15/19 

12:55)


   hyperventilate


ondansetron [From Zofran] Allergy (Verified 07/15/19 12:55)


   











Past Medical History





- Past Medical History


Cardiac Medical History: 


   Denies: Hx Coronary Artery Disease, Hx Heart Attack, Hx Hypertension


Pulmonary Medical History: Reports: Hx Pneumonia


   Denies: Hx Asthma, Hx Bronchitis, Hx COPD


Neurological Medical History: Denies: Hx Cerebrovascular Accident, Hx Seizures


Renal/ Medical History: Reports: Hx Ovarian Cysts.  Denies: Hx Peritoneal 

Dialysis


Musculoskeltal Medical History: Denies Hx Arthritis


Psychiatric Medical History: Reports: Hx Anxiety, Hx Attention Deficit H

yperactivity Disorder, Hx Depression


Past Surgical History: Reports: Hx Gynecologic Surgery - ovarian cyst bilat





- Immunizations


Hx Diphtheria, Pertussis, Tetanus Vaccination: Yes

## 2019-07-16 NOTE — EKG REPORT
SEVERITY:- BORDERLINE ECG -

SINUS RHYTHM

PROBABLE LEFT ATRIAL ABNORMALITY

:

Confirmed by: Russ Lai MD 16-Jul-2019 18:30:35

## 2023-06-07 NOTE — ER DOCUMENT REPORT
"Daphnie is a 41 Joselin Bellamy MD  [facility] [place]    Subjective   Daphnie is a 41 ED Medical Screen (RME)





- General


Stated Complaint: PAINFUL URINATION


Time seen by provider: 16:14


Mode of Arrival: Ambulatory


Information source: Patient


Notes: 


26-year-old female presents to ED for painful urination nausea and vomiting 

since yesterday and pelvic pain today.  Last menstrual period 01/14/2017.  

States she has a white vaginal discharge.




















I have greeted and performed a rapid initial assessment of this patient.  A 

comprehensive ED assessment and evaluation of the patient, analysis of test 

results and completion of medical decision making process will be conducted by 

an additional ED providers.


TRAVEL OUTSIDE OF THE [place] IN LAST 30 DAYS: No





- Related Data


Allergies/Adverse Reactions: 


 





Penicillins Allergy (Severe, Verified 01/27/17 16:14)


 Anaphylaxis


acetaminophen [From Tylenol] Allergy (Intermediate, Verified 01/27/17 16:14)


 rash


ketorolac tromethamine [From Toradol] Allergy (Intermediate, Verified 01/27/17 

16:14)


 hyperventilate











Past Medical History





- Past Medical History


Cardiac Medical History: 


   Denies: Hx Coronary Artery Disease, Hx Heart Attack, Hx Hypertension


Pulmonary Medical History: Reports: Hx Pneumonia


   Denies: Hx Asthma, Hx Bronchitis, Hx COPD


Neurological Medical History: Denies: Hx Cerebrovascular Accident, Hx Seizures


Renal/ Medical History: Reports: Hx Ovarian Cysts


Musculoskeltal Medical History: Denies Hx Arthritis


Psychiatric Medical History: Reports: Hx Anxiety, Hx Depression


Past Surgical History: Reports: Hx Gynecologic Surgery - ovarian cyst bilat





- Immunizations


Hx Diphtheria, Pertussis, Tetanus Vaccination: Yes 6/6/2023     3:44 PM   [id number] Results   KAYLA 